# Patient Record
Sex: MALE | Race: WHITE | Employment: STUDENT
[De-identification: names, ages, dates, MRNs, and addresses within clinical notes are randomized per-mention and may not be internally consistent; named-entity substitution may affect disease eponyms.]

---
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
## 2024-12-25 ENCOUNTER — HOSPITAL ENCOUNTER (EMERGENCY)
Facility: CLINIC | Age: 24
Discharge: HOME | End: 2024-12-25
Attending: EMERGENCY MEDICINE | Admitting: EMERGENCY MEDICINE
Payer: COMMERCIAL

## 2024-12-25 VITALS
SYSTOLIC BLOOD PRESSURE: 136 MMHG | TEMPERATURE: 97.5 F | DIASTOLIC BLOOD PRESSURE: 80 MMHG | RESPIRATION RATE: 18 BRPM | OXYGEN SATURATION: 98 % | HEART RATE: 119 BPM

## 2024-12-25 DIAGNOSIS — F10.920 ALCOHOLIC INTOXICATION WITHOUT COMPLICATION (H): ICD-10-CM

## 2024-12-25 PROCEDURE — 99284 EMERGENCY DEPT VISIT MOD MDM: CPT

## 2024-12-25 ASSESSMENT — COLUMBIA-SUICIDE SEVERITY RATING SCALE - C-SSRS
6. HAVE YOU EVER DONE ANYTHING, STARTED TO DO ANYTHING, OR PREPARED TO DO ANYTHING TO END YOUR LIFE?: NO
1. IN THE PAST MONTH, HAVE YOU WISHED YOU WERE DEAD OR WISHED YOU COULD GO TO SLEEP AND NOT WAKE UP?: NO
2. HAVE YOU ACTUALLY HAD ANY THOUGHTS OF KILLING YOURSELF IN THE PAST MONTH?: NO

## 2024-12-25 ASSESSMENT — ACTIVITIES OF DAILY LIVING (ADL): ADLS_ACUITY_SCORE: 41

## 2024-12-25 NOTE — ED TRIAGE NOTES
Pt BIBA d/t alcohol intoxication from [place] after knocking on random persons door. PD was called. Pt mentioned to PD that he may have hit his head but denied pain to EMS. Pt was asking for ride home from EMS and did not want to come to hospital. PBT .215, . VSS, ABCs intact, A&Ox4.

## 2024-12-25 NOTE — ED NOTES
Pt confused and very intoxicated. States PD took his phone but per EMS pt said his phone is at home and he did not have his wallet on him.

## 2024-12-25 NOTE — ED PROVIDER NOTES
Emergency Department Note      History of Present Illness     Chief Complaint   Alcohol Intoxication      HPI   Barry Lemon is a 24 year old male who presents to the emergency department with altered mental status due to alcohol intoxication.  Patient apparently was walking home from a party and began knocking on doors of the wrong houses.  Police were called and patient was brought to the emergency department for significant alcohol intoxication.  Patient denies any trauma.  Denies any complaints.  Denies any suicidal ideation.  Breath alcohol was 0.215 by police.    Past Medical History     Medical History and Problem List   No past medical history on file.    Medications   clonazePAM (KLONOPIN) 0.5 MG disintegrating tablet  diazepam (DIASTAT ACUDIAL) 10 MG GEL  diazepam (DIAZEPAM INTENSOL) 5 MG/ML solution  divalproex (DEPAKOTE) 125 MG EC tablet  Pediatric Vitamins CHEW  predniSONE (DELTASONE) 10 MG tablet        Surgical History   No past surgical history on file.    Physical Exam     Patient Vitals for the past 24 hrs:   BP Temp Temp src Pulse Resp SpO2   12/25/24 0402 -- -- -- -- -- 98 %   12/25/24 0401 -- -- -- -- -- 97 %   12/25/24 0400 136/80 -- -- 119 -- 98 %   12/25/24 0357 -- 97.5  F (36.4  C) Temporal -- 18 95 %   12/25/24 0356 -- -- -- -- -- 96 %   12/25/24 0354 128/78 -- -- 104 -- --     Physical Exam  General: Patient is alert, awake and interactive when I enter the room  Head: The scalp, face, and head appear normal  Eyes: Conjunctivae are normal  ENT: The nose is normal, Pinnae are normal, External acoustic canals are normal  Neck: Trachea midline  CV: Pulses are normal.   Resp: No respiratory distress   Musc: Normal muscular tone, moving all extremities.  Skin: No rash or lesions noted  Neuro:  Speech is slurred consistent with alcohol intoxication. Face is symmetric.   Psych: Normal affect.  Appropriate interactions.      Medical Decision Making / Diagnosis       TORO NORWOOD  Ion is a 24 year old male who presents for evaluation of altered mental status.  He is intoxicated here in ED and this is the most likely etiology for their AMS.  Nonetheless a broad differential diagnosis was considered for the altered state including drug ingestion, infection, intracerebral issues, metabolic derangements, psychiatric decompensation.  His head to toe exam is normal except for signs of alcohol intoxication.  He has no history of DT's or alcohol withdrawal seizures. He has no signs of trauma related to alcohol use and no further workup is needed including head CT. Supportive outpatient management is indicated as I do not believe there is a coingestion nor do I anticipate deterioration from their current level of mentation. He was ambulated and did well here in ED.  Patient has a sober ride  Alcohol abuse precautions are given for home.      Disposition   The patient was discharged.     Diagnosis     ICD-10-CM    1. Alcoholic intoxication without complication (H)  F10.920            MD Rishi Gutierrez Christopher Joseph, MD  12/25/24 0707